# Patient Record
Sex: FEMALE | Race: WHITE | URBAN - METROPOLITAN AREA
[De-identification: names, ages, dates, MRNs, and addresses within clinical notes are randomized per-mention and may not be internally consistent; named-entity substitution may affect disease eponyms.]

---

## 2017-06-22 ENCOUNTER — TELEPHONE (OUTPATIENT)
Dept: OBGYN CLINIC | Age: 27
End: 2017-06-22

## 2017-06-22 NOTE — TELEPHONE ENCOUNTER
Patient calling stating that she is currently using the nuvaring and is going aware on a trip and would like to instead of have her period, she would like to skip it and insert the next ring when its time to take it out. Patient advised that she can do that but there is no guarantee that she will not have a period. Patient verbalized understanding.